# Patient Record
Sex: FEMALE | Race: BLACK OR AFRICAN AMERICAN
[De-identification: names, ages, dates, MRNs, and addresses within clinical notes are randomized per-mention and may not be internally consistent; named-entity substitution may affect disease eponyms.]

---

## 2017-04-10 ENCOUNTER — HOSPITAL ENCOUNTER (OUTPATIENT)
Dept: HOSPITAL 45 - C.MAMM | Age: 52
Discharge: HOME | End: 2017-04-10
Attending: NURSE PRACTITIONER
Payer: COMMERCIAL

## 2017-04-10 DIAGNOSIS — R92.0: Primary | ICD-10-CM

## 2017-04-10 NOTE — MAMMOGRAPHY REPORT
BILATERAL DIGITAL DIAGNOSTIC MAMMOGRAM TOMOSYNTHESIS WITH CAD: 4/10/2017

CLINICAL HISTORY: Annual screening mammogram and follow-up of probably benign grouped microcalcifica
tions in the upper inner quadrant of the left breast.  





TECHNIQUE: Bilateral CC and MLO 2-D digital and tomosynthesis images were obtained.  Spot magnificat
ion left CC and ML views were also obtained.  Current study was also evaluated with a Computer Aided
 Detection (CAD) system.  



COMPARISON: Comparison is made to exams dated:  11/12/2016 mammogram, 10/4/2016 mammogram, 3/29/2016
 ultrasound, 3/29/2016 mammogram, and 3/18/2016 mammogram - [facility].   



BREAST COMPOSITION:  There are scattered areas of fibroglandular density in both breasts.  



FINDINGS: The nodular asymmetry in the subareolar right breast is less conspicuous comparing to the 
03/18/2016 mammogram.  There are clustered microcalcifications in the anterior subareolar right jerrell
st that appears similar in number and distribution comparing to the 2016 exam.  There is a small clu
ster of microcalcifications in the upper inner anterior left breast for which spot magnification vie
ws were obtained.  With spot magnification views, this small 4 mm cluster of punctate and smudgy pasha
rocalcifications is stable comparing back to 03/29/2016, most likely benign.  However, another 12 mo
nth follow-up exam is recommended to ensure at least 2 years of stability.  Spot magnification views
 could also be obtained in the anterior right breast again at that time to ensure at least 2 years o
f stability of the layering microcalcifications, given that they were not seen on the prior outside 
mammograms.  No new suspicious mass, focal area of architectural distortion or developing asymmetrie
s seen bilaterally.  No new suspicious microcalcifications.



IMPRESSION:  ACR-BI-RADS CATEGORY 3: PROBABLY BENIGN

Stable bilateral mammograms, including a small 4 mm grouping of benign-appearing microcalcifications
 in the upper inner anterior left breast.  Another follow-up bilateral diagnostic mammogram includin
g bilateral spot magnification views is recommended to ensure at least 2 years of stability of bilat
eral microcalcifications, although nearly all of the right-sided microcalcifications demonstrated la
yering on previous spot magnification views to confirm benign milk of calcium.



These results and recommendations were discussed with the patient at the time of the exam.  She tent
atively scheduled a follow-up appointment prior to leaving our department. 







Approximately 10% of breast cancers are not detected with mammography. A negative mammographic repor
t should not delay biopsy if a clinically suggestive mass is present.



Cristiana Park M.D.          

ay/:4/10/2017 11:16:47  



Imaging Technologist: Tracy BRUNSON)(NAIDA), [facility]

letter sent: Follow Up Recommended 3  

BI-RADS Code: ACR-BI-RADS Category 3: Probably Benign

## 2018-04-16 ENCOUNTER — HOSPITAL ENCOUNTER (OUTPATIENT)
Dept: HOSPITAL 45 - C.MAMM | Age: 53
Discharge: HOME | End: 2018-04-16
Attending: NURSE PRACTITIONER
Payer: COMMERCIAL

## 2018-04-16 DIAGNOSIS — R92.0: Primary | ICD-10-CM

## 2018-04-17 NOTE — MAMMOGRAPHY REPORT
BILATERAL DIGITAL DIAGNOSTIC MAMMOGRAM TOMOSYNTHESIS WITH CAD: 4/16/2018

CLINICAL HISTORY: 52-year-old woman presents at time of annual screening mammogram and also close fol
low-up of a probably benign grouping of microcalcifications in the upper inner anterior left breast. 
 





TECHNIQUE: Bilateral breast tomosynthesis in addition to standard 2D mammography was performed.  Spot
 magnification left CC and ML views were also obtained.  Current study was also evaluated with a Comp
uter Aided Detection (CAD) system.  

COMPARISON: Comparison is made to exams dated:  4/10/2017 mammogram - [facility], 1
1/12/2016 mammogram, 10/4/2016 mammogram, 3/29/2016 ultrasound, 3/29/2016 mammogram, and 3/18/2016 ma
mmogram - [facility].   



BREAST COMPOSITION:  There are scattered areas of fibroglandular density in both breasts.  



FINDINGS: The right breast parenchymal pattern is similar to prior mammograms, with a less conspicuou
s focal asymmetry in the 6:00 anterior right breast, but this area is again identified on the tomosyn
thesis images.  There is an associated grouping of microcalcifications, which previously demonstrated
 layering and tea cupping on the spot magnification ML view, confirming microcalcifications within mi
crocysts/milk of calcium.  No suspicious mass, architectural distortion or cluster of microcalcificat
ions is seen in the right breast.  



The left breast parenchymal pattern is similar to prior mammograms.  The spot magnification views of 
the left anterior breast redemonstrate a small grouping of punctate and amorphous microcalcifications
 in the upper inner anterior left breast that appears similar dating back to at least 3/29/2016.  Thi
s grouping of calcifications most likely also represent fibrocystic change but another 12 month follo
w-up evaluation is recommended to ensure longer stability.  No other new suspicious masses, calcifica
tions, areas of architectural distortion or asymmetries are seen in the left breast.





IMPRESSION:  ACR-BI-RADS CATEGORY 3: PROBABLY BENIGN

Stable bilateral mammograms, including a small grouping of punctate and amorphous microcalcifications
 in the upper inner anterior left breast.  Another 12 month follow-up diagnostic mammogram including 
left spot magnification views is recommended to ensure longer stability of the grouping of calcificat
ions.  Annual bilateral mammography will also be due at that time.



These results and recommendations were discussed with the patient at the time of the exam.





Approximately 10% of breast cancers are not detected with mammography. A negative mammographic report
 should not delay biopsy if a clinically suggestive mass is present.



Cristiana Park M.D.          

ay/:4/16/2018 11:12:14  



Imaging Technologist: Lashonda Rodney, [facility]

letter sent: Follow Up Recommended 3  

BI-RADS Code: ACR-BI-RADS Category 3: Probably Benign